# Patient Record
Sex: MALE | URBAN - METROPOLITAN AREA
[De-identification: names, ages, dates, MRNs, and addresses within clinical notes are randomized per-mention and may not be internally consistent; named-entity substitution may affect disease eponyms.]

---

## 2024-03-29 ENCOUNTER — OV NP (OUTPATIENT)
Dept: URBAN - METROPOLITAN AREA CLINIC 115 | Facility: CLINIC | Age: 19
End: 2024-03-29

## 2024-04-05 ENCOUNTER — OV NP (OUTPATIENT)
Dept: URBAN - METROPOLITAN AREA CLINIC 115 | Facility: CLINIC | Age: 19
End: 2024-04-05
Payer: COMMERCIAL

## 2024-04-05 VITALS
DIASTOLIC BLOOD PRESSURE: 81 MMHG | BODY MASS INDEX: 21.89 KG/M2 | WEIGHT: 136.2 LBS | HEIGHT: 66 IN | HEART RATE: 73 BPM | SYSTOLIC BLOOD PRESSURE: 125 MMHG | TEMPERATURE: 98 F

## 2024-04-05 DIAGNOSIS — R17 ELEVATED BILIRUBIN: ICD-10-CM

## 2024-04-05 PROCEDURE — 99203 OFFICE O/P NEW LOW 30 MIN: CPT

## 2024-04-05 NOTE — HPI-TODAY'S VISIT:
20 y/o M with PMH of hyperlipidemia, vitamin D deficiency presents with c/o elevated bilirubin. Labs from 3/15/24 show AST 18, ALT 14, alkP 90, tot bili 5.1, plts 254. FIB4 0.4. States at one time he had LUQ pain once for which he went to [facility] (had a KUB done and was told it was constipation) 1.5 yrs ago. Labs done at that time was negative. PCP had ordered US but did not schedule b/c she knew we can do it at our office if needed. BM frequency BID.  Denies jaundice, icterus, pruritus, n/v, diarrhea, constipation, blood in stool/vomit, dysphagia, odynophagia, unintentional weight loss, change in appetite, early satiety. Denies NSAID use, EtOH/tobacco use. No FHx of liver diseases.

## 2024-04-06 LAB
ALBUMIN: 4.6
ALKALINE PHOSPHATASE: 78
ALT (SGPT): 15
AST (SGOT): 18
BILIRUBIN, DIRECT: 0.19
BILIRUBIN, TOTAL: 3.2
PROTEIN, TOTAL: 6.8